# Patient Record
Sex: MALE | URBAN - METROPOLITAN AREA
[De-identification: names, ages, dates, MRNs, and addresses within clinical notes are randomized per-mention and may not be internally consistent; named-entity substitution may affect disease eponyms.]

---

## 2023-11-03 ENCOUNTER — OFFICE VISIT (OUTPATIENT)
Dept: URBAN - METROPOLITAN AREA CLINIC 40 | Facility: CLINIC | Age: 48
End: 2023-11-03

## 2023-11-08 ENCOUNTER — OFFICE VISIT (OUTPATIENT)
Dept: URBAN - METROPOLITAN AREA CLINIC 50 | Facility: CLINIC | Age: 48
End: 2023-11-08
Payer: COMMERCIAL

## 2023-11-08 ENCOUNTER — LAB OUTSIDE AN ENCOUNTER (OUTPATIENT)
Dept: URBAN - METROPOLITAN AREA CLINIC 50 | Facility: CLINIC | Age: 48
End: 2023-11-08

## 2023-11-08 VITALS
SYSTOLIC BLOOD PRESSURE: 143 MMHG | DIASTOLIC BLOOD PRESSURE: 87 MMHG | TEMPERATURE: 98.1 F | WEIGHT: 183 LBS | HEIGHT: 67 IN | HEART RATE: 81 BPM | BODY MASS INDEX: 28.72 KG/M2

## 2023-11-08 DIAGNOSIS — K80.20 ASYMPTOMATIC CHOLELITHIASIS: ICD-10-CM

## 2023-11-08 DIAGNOSIS — K21.9 ACID REFLUX: ICD-10-CM

## 2023-11-08 DIAGNOSIS — R93.2 ABNORMAL GALLBLADDER ULTRASOUND: ICD-10-CM

## 2023-11-08 DIAGNOSIS — R10.30 LOWER ABDOMINAL PAIN: ICD-10-CM

## 2023-11-08 PROBLEM — 16898991000119100: Status: ACTIVE | Noted: 2023-11-08

## 2023-11-08 PROBLEM — 54586004: Status: ACTIVE | Noted: 2023-11-08

## 2023-11-08 PROBLEM — 235595009: Status: ACTIVE | Noted: 2023-11-08

## 2023-11-08 PROBLEM — 266474003: Status: ACTIVE | Noted: 2023-11-08

## 2023-11-08 PROCEDURE — 99204 OFFICE O/P NEW MOD 45 MIN: CPT | Performed by: PHYSICIAN ASSISTANT

## 2023-11-08 RX ORDER — OMEPRAZOLE 40 MG/1
1 CAPSULE 30 MINUTES BEFORE MORNING MEAL CAPSULE, DELAYED RELEASE ORAL ONCE A DAY
Qty: 30 | Refills: 3 | OUTPATIENT
Start: 2023-11-08

## 2023-11-08 RX ORDER — DICYCLOMINE HYDROCHLORIDE 20 MG/1
1 TABLET TABLET ORAL THREE TIMES A DAY
Qty: 90 | Refills: 3 | OUTPATIENT
Start: 2023-11-08 | End: 2024-03-07

## 2023-11-08 NOTE — HPI-TODAY'S VISIT:
Patient is 47 y/o Kyrgyz speaking male here to discuss recent US abdomen completed at [facility] 10/16/23. Presents report revealing cholelithiasis with slightly thickened gallbladder wall measuring up to 4mm, may represent findings of chronic cholecystitis. No pericholecystic fluid. Note that acute cholecystitis cannot be excluded at that time. Consider HIDA for further evaluation.  States in past year has had significant lower abdominal pain, distention/bloating. [facility] [place] has been wanting to follow up on this but was pending insurance changes for follow up Feels full frequenctly, describes frequent L lower abdominal pain Describes BMs qd, no blood/dark black stools/mucus in stool, no change in bowel habits Denies pain after eating No vomiting, admits occasional nausea Admits frequent acid reflux, denies dysphagia States has not tried medicines to help Denies abnormal weight loss States last labs 10/23 with PCP were normal, denies anemia specifically Does not have copy of recent labs today Kyrgyz/English medical interpretation service provided by office staff, Himanshu Bach

## 2023-11-08 NOTE — PHYSICAL EXAM GASTROINTESTINAL
Abdomen , soft, bilateral lower abdominal tenderness, no rebounding, nondistended , no guarding or rigidity , no masses palpable , normal bowel sounds, negative soto's sign , Liver and Spleen,  no hepatosplenomegaly , liver nontender

## 2023-11-27 ENCOUNTER — LAB OUTSIDE AN ENCOUNTER (OUTPATIENT)
Dept: URBAN - METROPOLITAN AREA CLINIC 96 | Facility: CLINIC | Age: 48
End: 2023-11-27

## 2023-11-27 ENCOUNTER — WEB ENCOUNTER (OUTPATIENT)
Dept: URBAN - METROPOLITAN AREA CLINIC 96 | Facility: CLINIC | Age: 48
End: 2023-11-27

## 2023-12-08 ENCOUNTER — OFFICE VISIT (OUTPATIENT)
Dept: URBAN - METROPOLITAN AREA CLINIC 50 | Facility: CLINIC | Age: 48
End: 2023-12-08

## 2023-12-15 ENCOUNTER — LAB OUTSIDE AN ENCOUNTER (OUTPATIENT)
Dept: URBAN - METROPOLITAN AREA CLINIC 96 | Facility: CLINIC | Age: 48
End: 2023-12-15

## 2023-12-18 ENCOUNTER — TELEPHONE ENCOUNTER (OUTPATIENT)
Dept: URBAN - METROPOLITAN AREA CLINIC 50 | Facility: CLINIC | Age: 48
End: 2023-12-18

## 2023-12-22 ENCOUNTER — LAB OUTSIDE AN ENCOUNTER (OUTPATIENT)
Dept: URBAN - METROPOLITAN AREA CLINIC 50 | Facility: CLINIC | Age: 48
End: 2023-12-22

## 2023-12-22 ENCOUNTER — OFFICE VISIT (OUTPATIENT)
Dept: URBAN - METROPOLITAN AREA CLINIC 50 | Facility: CLINIC | Age: 48
End: 2023-12-22
Payer: COMMERCIAL

## 2023-12-22 ENCOUNTER — TELEPHONE ENCOUNTER (OUTPATIENT)
Dept: URBAN - METROPOLITAN AREA CLINIC 50 | Facility: CLINIC | Age: 48
End: 2023-12-22

## 2023-12-22 VITALS
TEMPERATURE: 97.3 F | SYSTOLIC BLOOD PRESSURE: 138 MMHG | WEIGHT: 185.7 LBS | DIASTOLIC BLOOD PRESSURE: 88 MMHG | HEART RATE: 70 BPM | HEIGHT: 67 IN | BODY MASS INDEX: 29.15 KG/M2

## 2023-12-22 DIAGNOSIS — K80.20 ASYMPTOMATIC CHOLELITHIASIS: ICD-10-CM

## 2023-12-22 DIAGNOSIS — R93.2 ABNORMAL CT SCAN, GALLBLADDER: ICD-10-CM

## 2023-12-22 DIAGNOSIS — K21.9 GASTROESOPHAGEAL REFLUX DISEASE, UNSPECIFIED WHETHER ESOPHAGITIS PRESENT: ICD-10-CM

## 2023-12-22 DIAGNOSIS — R10.30 LOWER ABDOMINAL PAIN: ICD-10-CM

## 2023-12-22 DIAGNOSIS — R10.10 UPPER ABDOMINAL PAIN: ICD-10-CM

## 2023-12-22 PROCEDURE — 99214 OFFICE O/P EST MOD 30 MIN: CPT | Performed by: PHYSICIAN ASSISTANT

## 2023-12-22 RX ORDER — DICYCLOMINE HYDROCHLORIDE 20 MG/1
1 TABLET TABLET ORAL THREE TIMES A DAY
Qty: 90 | Refills: 3 | Status: DISCONTINUED | COMMUNITY
Start: 2023-11-08 | End: 2024-03-07

## 2023-12-22 RX ORDER — OMEPRAZOLE 40 MG/1
1 CAPSULE 30 MINUTES BEFORE MORNING MEAL CAPSULE, DELAYED RELEASE ORAL ONCE A DAY
Qty: 30 | Refills: 3
Start: 2023-11-08

## 2023-12-22 RX ORDER — OMEPRAZOLE 40 MG/1
1 CAPSULE 30 MINUTES BEFORE MORNING MEAL CAPSULE, DELAYED RELEASE ORAL ONCE A DAY
Qty: 30 | Refills: 3 | Status: DISCONTINUED | COMMUNITY
Start: 2023-11-08

## 2023-12-22 RX ORDER — SODIUM, POTASSIUM,MAG SULFATES 17.5-3.13G
177ML SOLUTION, RECONSTITUTED, ORAL ORAL 1
Qty: 1 | Refills: 0 | OUTPATIENT
Start: 2023-12-22 | End: 2023-12-23

## 2023-12-22 NOTE — HPI-TODAY'S VISIT:
12/22/23: Patient here for f/u after recent HIDA and CT Notes sx unchanged, continues w abd discomfort, upper and lower, similar in past year Describes less gas, wondering if diet changes have helped improve - avoiding oily foods, switched to almond milk, avoiding fried meats Continues w lower discomfort, pain on pressing, and epigastric to RUQ discomfort w nausea, worse after eating Feeling bloating, uncomfortable still Does not believe dicyclomine or omeprazole has much helped No heart, lung, kidney, not on blood thinners, and can do stairs Vietnamese/English medical interpretation service provided by office staff, Emily Bach MA -- 11/8/23: Patient is 47 y/o Vietnamese speaking male here to discuss recent US abdomen completed at [facility] 10/16/23. Presents report revealing cholelithiasis with slightly thickened gallbladder wall measuring up to 4mm, may represent findings of chronic cholecystitis. No pericholecystic fluid. Note that acute cholecystitis cannot be excluded at that time. Consider HIDA for further evaluation.  States in past year has had significant lower abdominal pain, distention/bloating. [facility] [place] has been wanting to follow up on this but was pending insurance changes for follow up Feels full frequenctly, describes frequent L lower abdominal pain Describes BMs qd, no blood/dark black stools/mucus in stool, no change in bowel habits Denies pain after eating No vomiting, admits occasional nausea Admits frequent acid reflux, denies dysphagia States has not tried medicines to help Denies abnormal weight loss States last labs 10/23 with PCP were normal, denies anemia specifically Does not have copy of recent labs today Vietnamese/English medical interpretation service provided by office staff, Himanshu Bach

## 2023-12-22 NOTE — PHYSICAL EXAM GASTROINTESTINAL
Abdomen , soft, generalized tenderness abdominal, greatest in bilateral lower regions and epigastric to RUQ, negative murphys sign, mildly distended , no guarding or rigidity , no masses palpable , normal bowel sounds , Liver and Spleen,  no hepatosplenomegaly , liver nontender.

## 2024-01-16 ENCOUNTER — ERX REFILL RESPONSE (OUTPATIENT)
Dept: URBAN - METROPOLITAN AREA CLINIC 50 | Facility: CLINIC | Age: 49
End: 2024-01-16

## 2024-01-16 RX ORDER — OMEPRAZOLE 40 MG/1
1 CAPSULE 30 MINUTES BEFORE MORNING MEAL CAPSULE, DELAYED RELEASE ORAL ONCE A DAY
Qty: 30 | Refills: 3 | OUTPATIENT

## 2024-01-16 RX ORDER — OMEPRAZOLE 40 MG/1
TOME 1 CAPSULA POR VIA ORAL 30 MINUTOS BEFORE MORNING MEAL TODOS LOS DIAS FOR 30 DAYS CAPSULE, DELAYED RELEASE ORAL
Qty: 90 CAPSULE | Refills: 1 | OUTPATIENT

## 2024-02-14 ENCOUNTER — COL/EGD (OUTPATIENT)
Dept: URBAN - METROPOLITAN AREA SURGERY CENTER 18 | Facility: SURGERY CENTER | Age: 49
End: 2024-02-14
Payer: COMMERCIAL

## 2024-02-14 ENCOUNTER — LAB (OUTPATIENT)
Dept: URBAN - METROPOLITAN AREA CLINIC 4 | Facility: CLINIC | Age: 49
End: 2024-02-14
Payer: COMMERCIAL

## 2024-02-14 DIAGNOSIS — K21.9 ACID REFLUX: ICD-10-CM

## 2024-02-14 DIAGNOSIS — B96.81 BACTERIAL INFECTION DUE TO H. PYLORI: ICD-10-CM

## 2024-02-14 DIAGNOSIS — Z12.11 COLON CANCER SCREENING: ICD-10-CM

## 2024-02-14 DIAGNOSIS — B96.81 HELICOBACTER PYLORI [H. PYLORI] AS THE CAUSE OF DISEASES CLASSIFIED ELSEWHERE: ICD-10-CM

## 2024-02-14 DIAGNOSIS — D12.2 ADENOMA OF ASCENDING COLON: ICD-10-CM

## 2024-02-14 DIAGNOSIS — K29.60 ADENOPAPILLOMATOSIS GASTRICA: ICD-10-CM

## 2024-02-14 DIAGNOSIS — K29.60 OTHER GASTRITIS WITHOUT BLEEDING: ICD-10-CM

## 2024-02-14 DIAGNOSIS — K21.9 GASTRO-ESOPHAGEAL REFLUX DISEASE WITHOUT ESOPHAGITIS: ICD-10-CM

## 2024-02-14 DIAGNOSIS — D12.1 ADENOMA OF APPENDIX: ICD-10-CM

## 2024-02-14 PROCEDURE — 88313 SPECIAL STAINS GROUP 2: CPT | Performed by: PATHOLOGY

## 2024-02-14 PROCEDURE — 43239 EGD BIOPSY SINGLE/MULTIPLE: CPT | Performed by: INTERNAL MEDICINE

## 2024-02-14 PROCEDURE — 45385 COLONOSCOPY W/LESION REMOVAL: CPT | Performed by: INTERNAL MEDICINE

## 2024-02-14 PROCEDURE — 88342 IMHCHEM/IMCYTCHM 1ST ANTB: CPT | Performed by: PATHOLOGY

## 2024-02-14 PROCEDURE — 88305 TISSUE EXAM BY PATHOLOGIST: CPT | Performed by: PATHOLOGY

## 2024-02-14 PROCEDURE — 45381 COLONOSCOPY SUBMUCOUS NJX: CPT | Performed by: INTERNAL MEDICINE

## 2024-02-14 PROCEDURE — 45380 COLONOSCOPY AND BIOPSY: CPT | Performed by: INTERNAL MEDICINE

## 2024-02-14 PROCEDURE — G8907 PT DOC NO EVENTS ON DISCHARG: HCPCS | Performed by: INTERNAL MEDICINE

## 2024-02-14 PROCEDURE — 88312 SPECIAL STAINS GROUP 1: CPT | Performed by: PATHOLOGY

## 2024-02-14 RX ORDER — OMEPRAZOLE 40 MG/1
TOME 1 CAPSULA POR VIA ORAL 30 MINUTOS BEFORE MORNING MEAL TODOS LOS DIAS FOR 30 DAYS CAPSULE, DELAYED RELEASE ORAL
Qty: 90 CAPSULE | Refills: 1 | Status: ACTIVE | COMMUNITY

## 2024-03-15 ENCOUNTER — OV EP (OUTPATIENT)
Dept: URBAN - METROPOLITAN AREA CLINIC 50 | Facility: CLINIC | Age: 49
End: 2024-03-15

## 2024-03-15 RX ORDER — PANTOPRAZOLE SODIUM 40 MG/1
1 TABLET TABLET, DELAYED RELEASE ORAL
Qty: 126 | Refills: 0 | Status: ACTIVE | COMMUNITY
Start: 2024-02-14

## 2024-03-15 RX ORDER — OMEPRAZOLE 40 MG/1
1 CAPSULE 30 MINUTES BEFORE MORNING MEAL CAPSULE, DELAYED RELEASE ORAL ONCE A DAY
Qty: 30 | Refills: 3

## 2024-03-15 RX ORDER — OMEPRAZOLE 40 MG/1
TOME 1 CAPSULA POR VIA ORAL 30 MINUTOS BEFORE MORNING MEAL TODOS LOS DIAS FOR 30 DAYS CAPSULE, DELAYED RELEASE ORAL
Qty: 90 CAPSULE | Refills: 1 | Status: ACTIVE | COMMUNITY

## 2024-05-16 ENCOUNTER — TELEPHONE ENCOUNTER (OUTPATIENT)
Dept: URBAN - METROPOLITAN AREA CLINIC 98 | Facility: CLINIC | Age: 49
End: 2024-05-16

## 2024-05-16 RX ORDER — PANTOPRAZOLE SODIUM 40 MG/1
1 TABLET TABLET, DELAYED RELEASE ORAL ONCE A DAY
Qty: 30 | Refills: 0 | OUTPATIENT
Start: 2024-05-17

## 2024-05-17 ENCOUNTER — OFFICE VISIT (OUTPATIENT)
Dept: URBAN - METROPOLITAN AREA CLINIC 50 | Facility: CLINIC | Age: 49
End: 2024-05-17

## 2024-10-04 ENCOUNTER — OFFICE VISIT (OUTPATIENT)
Dept: URBAN - METROPOLITAN AREA CLINIC 50 | Facility: CLINIC | Age: 49
End: 2024-10-04
Payer: COMMERCIAL

## 2024-10-04 VITALS
HEIGHT: 67 IN | SYSTOLIC BLOOD PRESSURE: 118 MMHG | TEMPERATURE: 98.1 F | HEART RATE: 65 BPM | BODY MASS INDEX: 25.9 KG/M2 | WEIGHT: 165 LBS | DIASTOLIC BLOOD PRESSURE: 80 MMHG

## 2024-10-04 DIAGNOSIS — R10.9 ABDOMINAL DISCOMFORT: ICD-10-CM

## 2024-10-04 DIAGNOSIS — Z90.49 S/P CHOLECYSTECTOMY: ICD-10-CM

## 2024-10-04 DIAGNOSIS — K58.0 IRRITABLE BOWEL SYNDROME WITH DIARRHEA: ICD-10-CM

## 2024-10-04 DIAGNOSIS — R10.10 UPPER ABDOMINAL PAIN: ICD-10-CM

## 2024-10-04 DIAGNOSIS — Z86.19 HISTORY OF HELICOBACTER PYLORI INFECTION: ICD-10-CM

## 2024-10-04 DIAGNOSIS — K21.9 GASTROESOPHAGEAL REFLUX DISEASE, UNSPECIFIED WHETHER ESOPHAGITIS PRESENT: ICD-10-CM

## 2024-10-04 DIAGNOSIS — R14.0 BLOATING: ICD-10-CM

## 2024-10-04 PROBLEM — 428882003: Status: ACTIVE | Noted: 2024-10-04

## 2024-10-04 PROBLEM — 197125005: Status: ACTIVE | Noted: 2024-10-04

## 2024-10-04 PROCEDURE — 99214 OFFICE O/P EST MOD 30 MIN: CPT | Performed by: PHYSICIAN ASSISTANT

## 2024-10-04 RX ORDER — OMEPRAZOLE 40 MG/1
1 CAPSULE 30 MINUTES BEFORE MORNING MEAL CAPSULE, DELAYED RELEASE ORAL ONCE A DAY
Qty: 30 | Refills: 3 | Status: ON HOLD | COMMUNITY

## 2024-10-04 RX ORDER — PANTOPRAZOLE SODIUM 40 MG/1
1 TABLET TABLET, DELAYED RELEASE ORAL ONCE A DAY
Qty: 30 | Refills: 0 | Status: ACTIVE | COMMUNITY

## 2024-10-04 RX ORDER — RIFAXIMIN 550 MG/1
1 TABLET TABLET ORAL THREE TIMES A DAY
Qty: 42 TABLET | Refills: 0 | OUTPATIENT
Start: 2024-10-04

## 2024-10-04 RX ORDER — OMEPRAZOLE 40 MG/1
TOME 1 CAPSULA POR VIA ORAL 30 MINUTOS BEFORE MORNING MEAL TODOS LOS DIAS FOR 30 DAYS CAPSULE, DELAYED RELEASE ORAL
Qty: 90 CAPSULE | Refills: 1 | Status: ON HOLD | COMMUNITY

## 2024-10-04 RX ORDER — PANTOPRAZOLE SODIUM 40 MG/1
1 TABLET TABLET, DELAYED RELEASE ORAL
Qty: 126 | Refills: 0 | Status: ON HOLD | COMMUNITY

## 2024-10-04 RX ORDER — DICYCLOMINE HYDROCHLORIDE 20 MG/1
1 TABLET TABLET ORAL THREE TIMES A DAY
Qty: 270 TABLET | Refills: 0 | Status: ACTIVE | COMMUNITY

## 2024-10-04 NOTE — PHYSICAL EXAM GASTROINTESTINAL
Abdomen , soft, minimal epigastric tender, nondistended , no guarding or rigidity , no masses palpable , normal bowel sounds , Liver and Spleen,  no hepatosplenomegaly , liver nontender

## 2024-10-04 NOTE — HPI-TODAY'S VISIT:
10/4/24:  Pt here f/u bloating and gas Scottish/english interpretation provided by office staff, Himanshu Bach States some stomach troubles lately Had gallbladder surgery in August w [facility] - had a lot of issues prior to then Still having though of bloating, gas/discomfort after eating despite having gallbladder out Admits bad reflux, indigestion, lots of gas No nausea/vomiting Appetite good, no wt loss Did H pylori treatment back in March - felt better for awhile , but now not good Retested while off PPI/abx/pepto w UBT w PCP - negative 9/30/24, reviewed per [facility] Restarted PPI after testing in past week, not yet improving w pantoprazole BMs 1x/day, no blood/mucus/melena  No diarrhea/looseness Last CBC/CMP 9/13/24 per surgeon's office unremarkable for similar syx - reviewed on [facility] portal today -- 12/22/23: Patient here for f/u after recent HIDA and CT Notes sx unchanged, continues w abd discomfort, upper and lower, similar in past year Describes less gas, wondering if diet changes have helped improve - avoiding oily foods, switched to almond milk, avoiding fried meats Continues w lower discomfort, pain on pressing, and epigastric to RUQ discomfort w nausea, worse after eating Feeling bloating, uncomfortable still Does not believe dicyclomine or omeprazole has much helped No heart, lung, kidney, not on blood thinners, and can do stairs Scottish/English medical interpretation service provided by office staff, Emily Bach MA -- 11/8/23: Patient is 49 y/o Scottish speaking male here to discuss recent US abdomen completed at [facility] 10/16/23. Presents report revealing cholelithiasis with slightly thickened gallbladder wall measuring up to 4mm, may represent findings of chronic cholecystitis. No pericholecystic fluid. Note that acute cholecystitis cannot be excluded at that time. Consider HIDA for further evaluation.  States in past year has had significant lower abdominal pain, distention/bloating. [facility] has been wanting to follow up on this but was pending insurance changes for follow up Feels full frequenctly, describes frequent L lower abdominal pain Describes BMs qd, no blood/dark black stools/mucus in stool, no change in bowel habits Denies pain after eating No vomiting, admits occasional nausea Admits frequent acid reflux, denies dysphagia States has not tried medicines to help Denies abnormal weight loss States last labs 10/23 with PCP were normal, denies anemia specifically Does not have copy of recent labs today Scottish/English medical interpretation service provided by office staff, Himanshu Bach

## 2024-10-25 ENCOUNTER — OFFICE VISIT (OUTPATIENT)
Dept: URBAN - METROPOLITAN AREA CLINIC 50 | Facility: CLINIC | Age: 49
End: 2024-10-25

## 2024-10-25 ENCOUNTER — TELEPHONE ENCOUNTER (OUTPATIENT)
Dept: URBAN - METROPOLITAN AREA CLINIC 50 | Facility: CLINIC | Age: 49
End: 2024-10-25

## 2024-10-25 RX ORDER — PANTOPRAZOLE SODIUM 40 MG/1
1 TABLET TABLET, DELAYED RELEASE ORAL
Qty: 126 | Refills: 0 | Status: ON HOLD | COMMUNITY

## 2024-10-25 RX ORDER — PANTOPRAZOLE SODIUM 40 MG/1
1 TABLET TABLET, DELAYED RELEASE ORAL ONCE A DAY
Qty: 30 | Refills: 0 | Status: ACTIVE | COMMUNITY

## 2024-10-25 RX ORDER — RIFAXIMIN 550 MG/1
1 TABLET TABLET ORAL THREE TIMES A DAY
Qty: 42 TABLET | Refills: 0 | Status: ACTIVE | COMMUNITY
Start: 2024-10-04

## 2024-10-25 RX ORDER — OMEPRAZOLE 40 MG/1
1 CAPSULE 30 MINUTES BEFORE MORNING MEAL CAPSULE, DELAYED RELEASE ORAL ONCE A DAY
Qty: 30 | Refills: 3 | Status: ON HOLD | COMMUNITY

## 2024-10-25 RX ORDER — DICYCLOMINE HYDROCHLORIDE 20 MG/1
1 TABLET TABLET ORAL THREE TIMES A DAY
Qty: 270 TABLET | Refills: 0 | Status: ACTIVE | COMMUNITY

## 2024-10-25 RX ORDER — OMEPRAZOLE 40 MG/1
TOME 1 CAPSULA POR VIA ORAL 30 MINUTOS BEFORE MORNING MEAL TODOS LOS DIAS FOR 30 DAYS CAPSULE, DELAYED RELEASE ORAL
Qty: 90 CAPSULE | Refills: 1 | Status: ON HOLD | COMMUNITY

## 2024-10-31 ENCOUNTER — OFFICE VISIT (OUTPATIENT)
Dept: URBAN - METROPOLITAN AREA CLINIC 50 | Facility: CLINIC | Age: 49
End: 2024-10-31

## 2024-10-31 ENCOUNTER — DASHBOARD ENCOUNTERS (OUTPATIENT)
Age: 49
End: 2024-10-31

## 2024-10-31 VITALS
HEIGHT: 67 IN | BODY MASS INDEX: 25.9 KG/M2 | WEIGHT: 165 LBS | TEMPERATURE: 98 F | SYSTOLIC BLOOD PRESSURE: 123 MMHG | HEART RATE: 79 BPM | DIASTOLIC BLOOD PRESSURE: 92 MMHG

## 2024-10-31 RX ORDER — PANTOPRAZOLE SODIUM 40 MG/1
1 TABLET TABLET, DELAYED RELEASE ORAL
Qty: 126 | Refills: 0 | Status: ON HOLD | COMMUNITY

## 2024-10-31 RX ORDER — OMEPRAZOLE 40 MG/1
TOME 1 CAPSULA POR VIA ORAL 30 MINUTOS BEFORE MORNING MEAL TODOS LOS DIAS FOR 30 DAYS CAPSULE, DELAYED RELEASE ORAL
Qty: 90 CAPSULE | Refills: 1 | Status: ON HOLD | COMMUNITY

## 2024-10-31 RX ORDER — VONOPRAZAN FUMARATE 13.36 MG/1
1 TABLET TABLET ORAL ONCE A DAY
Qty: 90 TABLET | Refills: 1 | OUTPATIENT
Start: 2024-10-31

## 2024-10-31 RX ORDER — DICYCLOMINE HYDROCHLORIDE 20 MG/1
1 TABLET TABLET ORAL THREE TIMES A DAY
Qty: 270 TABLET | Refills: 0 | Status: ACTIVE | COMMUNITY

## 2024-10-31 RX ORDER — RIFAXIMIN 550 MG/1
1 TABLET TABLET ORAL THREE TIMES A DAY
Qty: 42 TABLET | Refills: 0 | Status: ACTIVE | COMMUNITY
Start: 2024-10-04

## 2024-10-31 RX ORDER — DICYCLOMINE HYDROCHLORIDE 20 MG/1
1 TABLET TABLET ORAL THREE TIMES A DAY
Qty: 270 TABLET | Refills: 0
End: 2025-01-29

## 2024-10-31 RX ORDER — OMEPRAZOLE 40 MG/1
1 CAPSULE 30 MINUTES BEFORE MORNING MEAL CAPSULE, DELAYED RELEASE ORAL ONCE A DAY
Qty: 30 | Refills: 3 | Status: ON HOLD | COMMUNITY

## 2024-10-31 RX ORDER — PANTOPRAZOLE SODIUM 40 MG/1
1 TABLET TABLET, DELAYED RELEASE ORAL ONCE A DAY
Qty: 30 | Refills: 0 | Status: ACTIVE | COMMUNITY

## 2024-10-31 NOTE — HPI-TODAY'S VISIT:
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  #[id number] Tried samples of voquezna after last visit  Was not able to fill xifaxan Noticed voquezna did help syx Indigestion/reflux/gas seems better w med and diet Does state cauliflower caused some distention, but otherwise seemed better  Also noticing oatmeal and milk/coffee send to be problematic Decaf coffee helps a little, but worse w milk BMs remain irregular, sometimes 1-2x/day, sometimes minimal production or diarrhea/looseness  Looseness/irregularity seems new since starting voquezna Did  -- 10/4/24:  Pt here f/u bloating and gas Haitian/english interpretation provided by office staff, Himanshu Bach States some stomach troubles lately Had gallbladder surgery in August w [place] - had a lot of issues prior to then Still having though of bloating, gas/discomfort after eating despite having gallbladder out Admits bad reflux, indigestion, lots of gas No nausea/vomiting Appetite good, no wt loss Did H pylori treatment back in March - felt better for awhile , but now not good Retested while off PPI/abx/pepto w [facility]BT w PCP - negative 9/30/24, reviewed per [facility] Restarted PPI after testing in past week, not yet improving w pantoprazole BMs 1x/day, no blood/mucus/melena  No diarrhea/looseness Last CBC/CMP 9/13/24 per surgeon's office unremarkable for similar syx - reviewed on [facility] portal today -- 12/22/23: Patient here for f/u after recent HIDA and CT Notes sx unchanged, continues w abd discomfort, upper and lower, similar in past year Describes less gas, wondering if diet changes have helped improve - avoiding oily foods, switched to almond milk, avoiding fried meats Continues w lower discomfort, pain on pressing, and epigastric to RUQ discomfort w nausea, worse after eating Feeling bloating, uncomfortable still Does not believe dicyclomine or omeprazole has much helped No heart, lung, kidney, not on blood thinners, and can do stairs Haitian/English medical interpretation service provided by office staff, Emily Bach MA -- 11/8/23: Patient is 49 y/o Haitian speaking male here to discuss recent US abdomen completed at [facility] 10/16/23. Presents report revealing cholelithiasis with slightly thickened gallbladder wall measuring up to 4mm, may represent findings of chronic cholecystitis. No pericholecystic fluid. Note that acute cholecystitis cannot be excluded at that time. Consider HIDA for further evaluation.  States in past year has had significant lower abdominal pain, distention/bloating. States has been wanting to follow up on this but was pending insurance changes for follow up Feels full frequenctly, describes frequent L lower abdominal pain Describes BMs qd, no blood/dark black stools/mucus in stool, no change in bowel habits Denies pain after eating No vomiting, admits occasional nausea Admits frequent acid reflux, denies dysphagia States has not tried medicines to help Denies abnormal weight loss States last labs 10/23 with PCP were normal, denies anemia specifically Does not have copy of recent labs today Haitian/English medical interpretation service provided by office staff, Himanshu Bach

## 2024-11-01 PROBLEM — 48694002: Status: ACTIVE | Noted: 2024-11-01

## 2024-11-22 ENCOUNTER — OFFICE VISIT (OUTPATIENT)
Dept: URBAN - METROPOLITAN AREA CLINIC 50 | Facility: CLINIC | Age: 49
End: 2024-11-22
Payer: COMMERCIAL

## 2024-11-22 VITALS
BODY MASS INDEX: 26.53 KG/M2 | HEIGHT: 67 IN | DIASTOLIC BLOOD PRESSURE: 88 MMHG | SYSTOLIC BLOOD PRESSURE: 131 MMHG | WEIGHT: 169 LBS | TEMPERATURE: 97.7 F

## 2024-11-22 DIAGNOSIS — R10.9 ABDOMINAL DISTRESS: ICD-10-CM

## 2024-11-22 DIAGNOSIS — R14.0 BLOATING: ICD-10-CM

## 2024-11-22 DIAGNOSIS — K21.9 GASTROESOPHAGEAL REFLUX DISEASE, UNSPECIFIED WHETHER ESOPHAGITIS PRESENT: ICD-10-CM

## 2024-11-22 DIAGNOSIS — F41.9 ANXIOUSNESS: ICD-10-CM

## 2024-11-22 DIAGNOSIS — R19.8 IRREGULAR BOWEL HABITS: ICD-10-CM

## 2024-11-22 DIAGNOSIS — Z86.19 HISTORY OF HELICOBACTER PYLORI INFECTION: ICD-10-CM

## 2024-11-22 DIAGNOSIS — Z90.49 S/P CHOLECYSTECTOMY: ICD-10-CM

## 2024-11-22 PROCEDURE — 99214 OFFICE O/P EST MOD 30 MIN: CPT | Performed by: PHYSICIAN ASSISTANT

## 2024-11-22 RX ORDER — RIFAXIMIN 550 MG/1
1 TABLET TABLET ORAL THREE TIMES A DAY
Qty: 42 TABLET | Refills: 0 | Status: ON HOLD | COMMUNITY
Start: 2024-10-04

## 2024-11-22 RX ORDER — PANTOPRAZOLE SODIUM 40 MG/1
1 TABLET TABLET, DELAYED RELEASE ORAL ONCE A DAY
Qty: 30 | Refills: 0 | Status: ON HOLD | COMMUNITY

## 2024-11-22 RX ORDER — PANTOPRAZOLE SODIUM 40 MG/1
1 TABLET TABLET, DELAYED RELEASE ORAL
Qty: 126 | Refills: 0 | Status: ON HOLD | COMMUNITY

## 2024-11-22 RX ORDER — OMEPRAZOLE 40 MG/1
TOME 1 CAPSULA POR VIA ORAL 30 MINUTOS BEFORE MORNING MEAL TODOS LOS DIAS FOR 30 DAYS CAPSULE, DELAYED RELEASE ORAL
Qty: 90 CAPSULE | Refills: 1 | Status: ON HOLD | COMMUNITY

## 2024-11-22 RX ORDER — DICYCLOMINE HYDROCHLORIDE 20 MG/1
1 TABLET TABLET ORAL THREE TIMES A DAY
Qty: 270 TABLET | Refills: 0 | Status: ACTIVE | COMMUNITY

## 2024-11-22 RX ORDER — VONOPRAZAN FUMARATE 13.36 MG/1
1 TABLET TABLET ORAL ONCE A DAY
Qty: 90 TABLET | Refills: 1 | Status: ACTIVE | COMMUNITY

## 2024-11-22 RX ORDER — OMEPRAZOLE 40 MG/1
1 CAPSULE 30 MINUTES BEFORE MORNING MEAL CAPSULE, DELAYED RELEASE ORAL ONCE A DAY
Qty: 30 | Refills: 3 | Status: ON HOLD | COMMUNITY

## 2024-11-22 RX ORDER — AMITRIPTYLINE HYDROCHLORIDE 100 MG/1
1 TABLET AT BEDTIME TABLET, FILM COATED ORAL ONCE A DAY
Qty: 30 | Refills: 1 | OUTPATIENT
Start: 2024-11-22

## 2024-11-22 NOTE — HPI-TODAY'S VISIT:
11/22/24: 48 y/o M here f/u bloating/gerd/gas French interpretor #[id number] States so-so, a bit better No nausea/vomiting, admits indigestion/reflux acid sensation May be slightly better but still having hard time Describes pain as mid to low abdomen BMs 1x/day, going well but formed/normal , no straining pushing No blood/mucus/melena Did do xifaxan samples for 1 week but never completed a second week as didnt get a call Does believe this helped a bit -- 10/31/24: 48 y/o M here f/u bloating/GERD/gas  Adrián,  #[id number] Tried samples of voquezna after last visit  Was not able to fill xifaxan Noticed voquezna did help syx Indigestion/reflux/gas seems better w med and diet Does state cauliflower caused some distention, but otherwise seemed better  Also noticing oatmeal and milk/coffee seem to be problematic Decaf coffee helps a little, but worse w milk BMs remain irregular/loose now, sometimes 1-2x/day, sometimes minimal production or diarrhea/looseness  Worried on continued syx Wants to know what other diet things to try -- 10/4/24:  Pt here f/u bloating and gas French/english interpretation provided by office staff, Himanshu Bach States some stomach troubles lately Had gallbladder surgery in August w [facility] - had a lot of issues prior to then Still having though of bloating, gas/discomfort after eating despite having gallbladder out Admits bad reflux, indigestion, lots of gas No nausea/vomiting Appetite good, no wt loss Did H pylori treatment back in March - felt better for awhile, but now not good Retested while off PPI/abx/pepto w [facility]BT w PCP - negative 9/30/24, reviewed per [facility] Restarted PPI after testing in past week, not yet improving w pantoprazole BMs 1x/day, no blood/mucus/melena  No diarrhea/looseness Last CBC/CMP 9/13/24 per surgeon's office unremarkable for similar syx - reviewed on [facility] portal today -- 12/22/23: Patient here for f/u after recent HIDA and CT Notes sx unchanged, continues w abd discomfort, upper and lower, similar in past year Describes less gas, wondering if diet changes have helped improve - avoiding oily foods, switched to almond milk, avoiding fried meats Continues w lower discomfort, pain on pressing, and epigastric to RUQ discomfort w nausea, worse after eating Feeling bloating, uncomfortable still Does not believe dicyclomine or omeprazole has much helped No heart, lung, kidney, not on blood thinners, and can do stairs French/English medical interpretation service provided by office staff, Emily Bach MA -- 11/8/23: Patient is 47 y/o French speaking male here to discuss recent US abdomen completed at [facility] 10/16/23. Presents report revealing cholelithiasis with slightly thickened gallbladder wall measuring up to 4mm, may represent findings of chronic cholecystitis. No pericholecystic fluid. Note that acute cholecystitis cannot be excluded at that time. Consider HIDA for further evaluation.  States in past year has had significant lower abdominal pain, distention/bloating. [facility] has been wanting to follow up on this but was pending insurance changes for follow up Feels full frequenctly, describes frequent L lower abdominal pain Describes BMs qd, no blood/dark black stools/mucus in stool, no change in bowel habits Denies pain after eating No vomiting, admits occasional nausea Admits frequent acid reflux, denies dysphagia States has not tried medicines to help Denies abnormal weight loss States last labs 10/23 with PCP were normal, denies anemia specifically Does not have copy of recent labs today French/English medical interpretation service provided by office staff, Himanshu Bach

## 2025-01-03 ENCOUNTER — OFFICE VISIT (OUTPATIENT)
Dept: URBAN - METROPOLITAN AREA CLINIC 50 | Facility: CLINIC | Age: 50
End: 2025-01-03
Payer: COMMERCIAL

## 2025-01-03 VITALS
WEIGHT: 183 LBS | DIASTOLIC BLOOD PRESSURE: 92 MMHG | HEIGHT: 67 IN | BODY MASS INDEX: 28.72 KG/M2 | TEMPERATURE: 98 F | SYSTOLIC BLOOD PRESSURE: 133 MMHG | HEART RATE: 81 BPM

## 2025-01-03 DIAGNOSIS — Z86.19 HISTORY OF HELICOBACTER PYLORI INFECTION: ICD-10-CM

## 2025-01-03 DIAGNOSIS — F41.9 ANXIOUSNESS: ICD-10-CM

## 2025-01-03 DIAGNOSIS — R19.8 IRREGULAR BOWEL HABITS: ICD-10-CM

## 2025-01-03 DIAGNOSIS — Z90.49 S/P CHOLECYSTECTOMY: ICD-10-CM

## 2025-01-03 DIAGNOSIS — R14.0 BLOATING: ICD-10-CM

## 2025-01-03 DIAGNOSIS — K21.9 GASTROESOPHAGEAL REFLUX DISEASE, UNSPECIFIED WHETHER ESOPHAGITIS PRESENT: ICD-10-CM

## 2025-01-03 DIAGNOSIS — R10.9 ABDOMINAL DISTRESS: ICD-10-CM

## 2025-01-03 PROCEDURE — 99214 OFFICE O/P EST MOD 30 MIN: CPT | Performed by: PHYSICIAN ASSISTANT

## 2025-01-03 RX ORDER — RABEPRAZOLE SODIUM 20 MG/1
1 TABLET TABLET, DELAYED RELEASE ORAL ONCE A DAY
Qty: 90 TABLET | Refills: 3 | OUTPATIENT
Start: 2025-01-03

## 2025-01-03 RX ORDER — OMEPRAZOLE 40 MG/1
1 CAPSULE 30 MINUTES BEFORE MORNING MEAL CAPSULE, DELAYED RELEASE ORAL ONCE A DAY
Qty: 30 | Refills: 3 | Status: ON HOLD | COMMUNITY

## 2025-01-03 RX ORDER — VONOPRAZAN FUMARATE 13.36 MG/1
1 TABLET TABLET ORAL ONCE A DAY
Qty: 90 TABLET | Refills: 1 | Status: ON HOLD | COMMUNITY

## 2025-01-03 RX ORDER — AMITRIPTYLINE HYDROCHLORIDE 100 MG/1
1 TABLET AT BEDTIME TABLET, FILM COATED ORAL ONCE A DAY
Qty: 30 | Refills: 1 | Status: ACTIVE | COMMUNITY
Start: 2024-11-22

## 2025-01-03 RX ORDER — OMEPRAZOLE 40 MG/1
TOME 1 CAPSULA POR VIA ORAL 30 MINUTOS BEFORE MORNING MEAL TODOS LOS DIAS FOR 30 DAYS CAPSULE, DELAYED RELEASE ORAL
Qty: 90 CAPSULE | Refills: 1 | Status: ON HOLD | COMMUNITY

## 2025-01-03 RX ORDER — PANTOPRAZOLE SODIUM 40 MG/1
1 TABLET TABLET, DELAYED RELEASE ORAL
Qty: 126 | Refills: 0 | Status: ON HOLD | COMMUNITY

## 2025-01-03 RX ORDER — RIFAXIMIN 550 MG/1
1 TABLET TABLET ORAL THREE TIMES A DAY
Qty: 42 TABLET | Refills: 0 | Status: ON HOLD | COMMUNITY

## 2025-01-03 RX ORDER — PANTOPRAZOLE SODIUM 40 MG/1
1 TABLET TABLET, DELAYED RELEASE ORAL ONCE A DAY
Qty: 30 | Refills: 0 | Status: ON HOLD | COMMUNITY

## 2025-01-03 RX ORDER — DICYCLOMINE HYDROCHLORIDE 20 MG/1
1 TABLET TABLET ORAL THREE TIMES A DAY
Qty: 270 TABLET | Refills: 0 | Status: ACTIVE | COMMUNITY

## 2025-01-03 RX ORDER — DICYCLOMINE HYDROCHLORIDE 20 MG/1
1 TABLET TABLET ORAL THREE TIMES A DAY
Qty: 270 TABLET | Refills: 0
End: 2025-04-03

## 2025-01-03 RX ORDER — AMITRIPTYLINE HYDROCHLORIDE 100 MG/1
1 TABLET AT BEDTIME TABLET, FILM COATED ORAL ONCE A DAY
Qty: 90 TABLET | Refills: 1 | OUTPATIENT

## 2025-01-03 NOTE — HPI-TODAY'S VISIT:
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, North Korean speaking North Korean interpretor; Joann #[id number] Noticing bloating in interval has been somewhat bteter w elavil use Hasn't been able to stay on voquezna given cost BMs seem normal, BMs 1-2x/day, no diarrhea, good evauation, no straining No nausea/vomiting Occ reflux, not currently on PPI but overall seems better Feels slightly colicky w certain foods at present - beer, spicy, coffee -- 11/22/24: 48 y/o M here f/u bloating/gerd/gas North Korean interpretor #[id number] States so-so, a bit better No nausea/vomiting, admits indigestion/reflux acid sensation May be slightly better but still having hard time Describes pain as mid to low abdomen BMs 1x/day, going well but formed/normal , no straining pushing No blood/mucus/melena Did do xifaxan samples for 1 week but never completed a second week as didnt get a call Does believe this helped a bit -- 10/31/24: 48 y/o M here f/u bloating/GERD/gas  Adrián,  #[id number] Tried samples of voquezna after last visit  Was not able to fill xifaxan Noticed voquezna did help syx Indigestion/reflux/gas seems better w med and diet Does state cauliflower caused some distention, but otherwise seemed better  Also noticing oatmeal and milk/coffee seem to be problematic Decaf coffee helps a little, but worse w milk BMs remain irregular/loose now, sometimes 1-2x/day, sometimes minimal production or diarrhea/looseness  Worried on continued syx Wants to know what other diet things to try -- 10/4/24:  Pt here f/u bloating and gas North Korean/english interpretation provided by office staff, Himanshu Bach States some stomach troubles lately Had gallbladder surgery in August w [place] - had a lot of issues prior to then Still having though of bloating, gas/discomfort after eating despite having gallbladder out Admits bad reflux, indigestion, lots of gas No nausea/vomiting Appetite good, no wt loss Did H pylori treatment back in March - felt better for awhile, but now not good Retested while off PPI/abx/pepto w UBT w PCP - negative 9/30/24, reviewed per [facility] Restarted PPI after testing in past week, not yet improving w pantoprazole BMs 1x/day, no blood/mucus/melena  No diarrhea/looseness Last CBC/CMP 9/13/24 per surgeon's office unremarkable for similar syx - reviewed on [place] portal today -- 12/22/23: Patient here for f/u after recent HIDA and CT Notes sx unchanged, continues w abd discomfort, upper and lower, similar in past year Describes less gas, wondering if diet changes have helped improve - avoiding oily foods, switched to almond milk, avoiding fried meats Continues w lower discomfort, pain on pressing, and epigastric to RUQ discomfort w nausea, worse after eating Feeling bloating, uncomfortable still Does not believe dicyclomine or omeprazole has much helped No heart, lung, kidney, not on blood thinners, and can do stairs North Korean/English medical interpretation service provided by office staff, Emily Bach MA -- 11/8/23: Patient is 49 y/o North Korean speaking male here to discuss recent US abdomen completed at [facility] 10/16/23. Presents report revealing cholelithiasis with slightly thickened gallbladder wall measuring up to 4mm, may represent findings of chronic cholecystitis. No pericholecystic fluid. Note that acute cholecystitis cannot be excluded at that time. Consider HIDA for further evaluation.  States in past year has had significant lower abdominal pain, distention/bloating. [facility] has been wanting to follow up on this but was pending insurance changes for follow up Feels full frequenctly, describes frequent L lower abdominal pain Describes BMs qd, no blood/dark black stools/mucus in stool, no change in bowel habits Denies pain after eating No vomiting, admits occasional nausea Admits frequent acid reflux, denies dysphagia States has not tried medicines to help Denies abnormal weight loss States last labs 10/23 with PCP were normal, denies anemia specifically  Does not have copy of recent labs today North Korean/English medical interpretation service provided by office staff, Himanshu Bach